# Patient Record
(demographics unavailable — no encounter records)

---

## 2024-11-12 NOTE — HISTORY OF PRESENT ILLNESS
[FreeTextEntry1] : 55F with a surgical hx of a laparoscopic sleeve gastrectomy with hiatal hernia repair 1/2023 in [place] with complications leading to the need for a conversion to a RYGB subsequently complicated with dysphagia and po intolerance PICC and TPN. She was hospitalized most recently for a bowel obstruction at [place] that was treated nonoperatively however she was told that she had a large 8cm fecaloma. Over the past 2 years she has had considerable weight loss and is now malnourished with a BMI of 16.5.  She is currently not using laxatives, suppositories or enemas and is not having bowel movements regularly. Prior to her index gastric procedure she use to have a cathartic BM every other day without the use of laxatives or stimulants. Currently BM would range between diarrhea and constipation. Had three hard BM yesterday. Prior to yesterday's BM, had not moved her bowels in three weeks.  Has an UGI follow through scheduled for this Friday scheduled by Dr. Martinez for preoperative assessment.  PMH: Anemia, thrombophilia, asthma, COPD, CKD, Lupus, Fibromyalgia   Meds: lovenox, protonix , nurtec, zofran, reglan, benadryl, toradol, diflucan  All: ciprofloxacin, codeine, Demerol, fentanyl, flagyl, morphine, penicillin, percocet, tramadol, valium, blueberries, mussels, clams.   PSH: LSG, RYGB, HHR, Cholecystectomy, Hysterectomy, sinus surgery  FH: Uncle (84) recently diagnosed with metastatic rectal cancer.  Cscope: 6/8/21 Reports last done by Dr. Augustine Reese

## 2024-11-12 NOTE — PLAN
[TextEntry] : - Recommend Fleet enema every other day to facilitate emptying 3-4x/weekly. Considering her extended bowel rest, expected limited stool volume. - RTC in 2-3 weeks for flexible sigmoidoscopy to evaluate for persistent sigmoidal impaction - Limited therapeutic options considering inability to tolerate oral laxatives. - If more proximal impaction on repeat endoscopy will consider CT

## 2024-11-12 NOTE — PHYSICAL EXAM
[JVD] : no jugular venous distention  [Normal Breath Sounds] : Normal breath sounds [Alert] : alert [Calm] : calm [de-identified] : Soft, nontender, nondistended. Well healed laparoscopic scars. Left lower quadrant fullness without tenderness.  [de-identified] : Thin appearing female in NAD [de-identified] : MMM [de-identified] : ROM WNL, RUE PICC in place without signs of infection [FreeTextEntry1] : The pt was examined in the left lateral decubitus position with a medical assistant present for the entirety of the examination. Visual examination of the anal verge with effacement of the buttocks revealed no masses, ulcerations, fissures or skin rashes. Digital rectal exam revealed no palpable mass or blood with sphincter tone within normal limits. An empty rectal vault with limited soft formed stool evacuated. A lubricated anoscope was then inserted revealing healthy appearing distal rectal mucosa and anoderm with three appropriately sized, noninflamed internal hemorrhoids.  A lubricated/lighted rigid proctoscope was then inserted to 15cm from the AV revealing normal appearing, pink and healthy rectal mucosa with formed, soft, not impacted stool at roughly 15cm from the AV that was partially removed with a long biopys forcep. The patient tolerated the examination well.

## 2024-11-12 NOTE — ASSESSMENT
[FreeTextEntry1] : 55F with considerable dysphagia due to her previous bariatric surgery now with the inability to tolerate po and living with TPN, has developed a degree of dysfunctional colon with recent fecaloma.

## 2024-11-24 NOTE — PLAN
[FreeTextEntry1] : -Plan for small bowel resection, possible revision of gastrojejunostomy and distal gastrectomy -Continue TPN to optimize nutritional parameters -Work with our dietitian to advance diet over the next 4-6 weeks to help regain weight -Follow up with Dr. Ayon on 12/11 for management if fecaloma and plan for surgery the week after -Complete full vitamins lab panel -Continue PPI therapy -Will require medical clearance and hematology clearance (hx of four blood clotting disorders, on Lovenox) -Will plan for half dose of Lovenox the night before surgery and half dose the morning of surgery but will refer to hematology recommendations.

## 2024-11-24 NOTE — ASSESSMENT
[FreeTextEntry1] : Ms. Johnston presents today with hx of conversion of sleeve gastrectomy to RYGB with PO intolerance and dysphagia, currently on TPN. She also has confirmed ulcerations on her stomach from endoscopy. Barium swallow esophagram and records reviewed today. Discussed several surgical options including re-doing the GJ anastomosis, bypass the stomach and connect her small intestine directly to her esophagus, possibly resect the blind loop but given the marginal ulcer, revision of GJ may be the better option. Her BMI is 16 despite increasing calories in TPN, discussed possibility of tube feedings but patient is not amenable to this at this time. The plan is to move forward with small bowel resection, possible revision of gastrojejunostomy and distal gastrectomy. If not responding well, the next option may be to bypass the stomach and connect small intestine to esophagus.   -Continue TPN to optimize nutritional parameters -Work with our dietitian to advance diet over the next 4-6 weeks to help regain weight -Follow up with Dr. Ayon on 12/11 for management if fecaloma and plan for surgery the week after -Complete full vitamins lab panel -Continue PPI therapy -Will require medical clearance and hematology clearance (hx of four blood clotting disorders, on Lovenox) -Will plan for half dose of Lovenox the night before surgery and half dose the morning of surgery but will refer to hematology recommendations.

## 2024-11-24 NOTE — HISTORY OF PRESENT ILLNESS
[Home] : at home, [unfilled] , at the time of the visit. [Medical Office: ([facility])___] : at the medical office located in  [Verbal consent obtained from patient] : the patient, [unfilled] [de-identified] : 55 year old female with multiple prior surgeries, most recently had her sleeve converted to a RYGB in May 2024 with Dr. Park at [facility]. Since her initial sleeve surgery in January 2023, she has been on TPN for maintenance of hejr nutrition due to PO intolerance and dysphagia. Dr. Park placed a stent, which was needed due to the angle of her anastomosis. Unfortunately she was not able to tolerate the stent and it was removed before being able to improve the problem. She was evaluated by Dr. Ayon for fecaloma who recommended fleet enema every other day to facilitate gastric emptying 3-4x/weekly and return to office for flexible sigmoidoscopy to evaluate for persistent sigmoidal impaction.    Xray Esophagram 11/15/24- Unremarkable esophagram.  Xray Cinesophagram 11/15/24- Swallowing and passage through the esophagus demonstrates normal motility with barium soaked bread. Speech pathology impression: moderate oral stage dysphagia characterized by prolonged bolus preparation, rumination and piecemeal deglutition with impact to swallow efficiency. Pharyngeal swallow is functional with no penetration, aspiration or pharyngeal residue after the swallow. [id number] patient resume a modified texture oral diet slowly with soft/moist solids and thin loquids with approval from surgeon and GI. Endoflip- normal findings

## 2024-11-24 NOTE — END OF VISIT
[FreeTextEntry3] : Alessandra Horvath NP scribed for me Dr. Artis Martinez, in my presence and the the presence of KANDICE MEADOWS either physically or via video.  The scribe documented the following sections when they are included: HISTORY OF PRESENT ILLNESS, PAST MEDICAL/SURGICAL/FAMILY/SOCIAL HISTORY; REVIEW OF SYSTEMS; VITAL SIGNS; PHYSICAL EXAM; DISPOSITION as well as ASSESSMENT and PLAN.   I confirm that the documentation above accurately reflects my interaction with the patient KANDICE MEADOWS.  I spent 45 minutes in the care of this patient including preparation, chart review, face to face time, imaging and or test results review and documentation.

## 2024-11-24 NOTE — HISTORY OF PRESENT ILLNESS
[Home] : at home, [unfilled] , at the time of the visit. [Medical Office: ([facility])___] : at the medical office located in  [Verbal consent obtained from patient] : the patient, [unfilled] [de-identified] : 55 year old female with multiple prior surgeries, most recently had her sleeve converted to a RYGB in May 2024 with Dr. Park at [facility]. Since her initial sleeve surgery in January 2023, she has been on TPN for maintenance of hejr nutrition due to PO intolerance and dysphagia. Dr. Park placed a stent, which was needed due to the angle of her anastomosis. Unfortunately she was not able to tolerate the stent and it was removed before being able to improve the problem. She was evaluated by Dr. Ayon for fecaloma who recommended fleet enema every other day to facilitate gastric emptying 3-4x/weekly and return to office for flexible sigmoidoscopy to evaluate for persistent sigmoidal impaction.    Xray Esophagram 11/15/24- Unremarkable esophagram.  Xray Cinesophagram 11/15/24- Swallowing and passage through the esophagus demonstrates normal motility with barium soaked bread. Speech pathology impression: moderate oral stage dysphagia characterized by prolonged bolus preparation, rumination and piecemeal deglutition with impact to swallow efficiency. Pharyngeal swallow is functional with no penetration, aspiration or pharyngeal residue after the swallow. [id number] patient resume a modified texture oral diet slowly with soft/moist solids and thin loquids with approval from surgeon and GI. Endoflip- normal findings

## 2024-12-11 NOTE — HISTORY OF PRESENT ILLNESS
[FreeTextEntry1] : 55F with a surgical hx of a laparoscopic sleeve gastrectomy with hiatal hernia repair 1/2023 in [place] with complications leading to the need for a conversion to a RYGB subsequently complicated with dysphagia and po intolerance PICC and TPN. She was hospitalized most recently for a bowel obstruction at [place] that was treated nonoperatively however she was told that she had a large 8cm fecaloma. Over the past 2 years she has had considerable weight loss and is now malnourished with a BMI of 16.5. She is currently not using laxatives, suppositories or enemas and is not having bowel movements regularly. Prior to her index gastric procedure she used to have a cathartic BM every other day without the use of laxatives or stimulants. On her initial visit last month, soft nonimpacted stool was visualized 15cm from anal verge and was partially removed with long biopsy forceps. Patient here today for flexible sigmoidoscopy to evaluate for sigmoid fecal impaction. Since last visit she had diarrhea for a week and started using the enema every other day, her last bowel movement was Sunday and her last enema was Monday.  Of note, she is currently on Cefazolin 500mg TID for a PICC line infection and has a RUE DVT which she is continuing her lovenox to treat.  She is scheduled for small bowel resection on 12/16 with Dr. Monaco.  PMH: Anemia, thrombophilia, asthma, COPD, CKD, Lupus, Fibromyalgia Meds: lovenox, protonix , nurtec, zofran, reglan, benadryl, toradol, diflucan All: ciprofloxacin, codeine, Demerol, fentanyl, flagyl, morphine, penicillin, percocet, tramadol, valium, blueberries, mussels, clams. PSH: LSG, RYGB, HHR, Cholecystectomy, Hysterectomy, sinus surgery FH: Uncle (84) recently diagnosed with metastatic rectal cancer. Cscope: 6/8/21 Reports last done by Dr. Augustine Reese

## 2024-12-11 NOTE — PHYSICAL EXAM
[JVD] : no jugular venous distention  [Normal Breath Sounds] : Normal breath sounds [Alert] : alert [Calm] : calm [de-identified] : Soft, nontender, nondistended. Well healed laparoscopic scars. Left lower quadrant fullness without tenderness.  [de-identified] : Thin appearing female in NAD [de-identified] : MMM [de-identified] : ROM WNL, RUE PICC in place without signs of infection [FreeTextEntry1] : The pt was examined in the left lateral decubitus position with a medical assistant present for the entirety of the examination. Visual examination of the anal verge with effacement of the buttocks revealed no masses, ulcerations, fissures or skin rashes. Digital rectal exam revealed no palpable mass or blood with sphincter tone within normal limits. A lubricated flexible sigmoidoscope was inserted to 60cm revealing healthy left and sigmoid colon with tortuous redundant sigmoid colon. Formed stool encountered likely at the splenic flexure that was partially traversable, did not appear to be impacted and there was no reactive colitis associated with the area. Otherwise upon withdrawal there was normal appearing mucosa without ulceration, mass or inflammation.  The patient tolerated the exam well.

## 2025-01-22 NOTE — HISTORY OF PRESENT ILLNESS
[de-identified] : This is a 54 yo female with multiple prior surgeries, most recently had her sleeve converted to a RYGB in May 2024 with Dr. Park at [facility] presents today for follow up s/p uncomplicated small bowel resections revision of gastrojejunostomy distal gastectomy on12/16/2024.  Post-operatively patient is doing well, report having better food tolerance with some residual difficulty. Pain is well controlled and has been improving. Patient is tolerating puree diet with mashed potatoes and grounded chicken, still on TPN with frequent nausea or vomiting(foamy) with 2 lb weight increase from 84lb to 86lbs. Pt reports food stucking with swallowing. Denies any difficulty with bowel movements or urinating. Denies constipation. Offers no other acute complaints at this time. Denies chest pain, SOB, CHRISTOPHER, calf pain, HA, dizziness, fever or chills since surgery.     Imaging: Labs 11/2024- elevated BUN/Creat ratio(31), low CO2(15) Xray ribs right with PA chest 11/25/24- negative exam. normal heart size and pulmonary vascularity. No fracture. EKG 11/26/24- NSR Xray Esophagram 11/15/24- Unremarkable esophagram. Xray Cinesophagram 11/15/24- Swallowing and passage through the esophagus demonstrates normal motility with barium soaked bread. Speech pathology impression: moderate oral stage dysphagia characterized by prolonged bolus preparation, rumination and piecemeal deglutition with impact to swallow efficiency. Pharyngeal swallow is functional with no penetration, aspiration or pharyngeal residue after the swallow. [id number] patient resume a modified texture oral diet slowly with soft/moist solids and thin liquids with approval from surgeon and GI. Endoflip- normal findings.

## 2025-01-22 NOTE — PHYSICAL EXAM
[Thin] : thin [Normal] : affect appropriate [de-identified] : Underweight  [de-identified] : Incision - healing well, clean dry intact, no evidence of drainage, purulence, surrounding erythema/ seroma/ hematoma/ bleeding. No recurrent of hernia with valsalva changes. [FreeTextEntry1] : deferred

## 2025-01-22 NOTE — PHYSICAL EXAM
[Thin] : thin [Normal] : affect appropriate [de-identified] : Underweight  [de-identified] : Incision - healing well, clean dry intact, no evidence of drainage, purulence, surrounding erythema/ seroma/ hematoma/ bleeding. No recurrent of hernia with valsalva changes. [FreeTextEntry1] : deferred

## 2025-01-22 NOTE — REASON FOR VISIT
[Post Operative Visit] : a post operative visit for [FreeTextEntry2] : 5 weeks f/u s/p small bowel resections revision of gastrojejunostomy distal gastectomy 12/16/2024

## 2025-01-22 NOTE — ADDENDUM
[FreeTextEntry1] :  I, JASMIN DEGROOT, am scribing for and in the presence of Dr. Martinez for the following sections: HISTORY OF PRESENT ILLNESS, PAST MEDICAL HISTORY, PAST SURGICAL HISTORY, FAMILY/SOCIAL HISTORY, REVIEW OF SYSTEMS, VITAL SIGNS, PHYSICAL EXAM, DISPOSITION. Reviewed current treatment plan, including medications / surgical intervention / lifestyle modifications where indicated. Reviewed imaging, laboratory results, or other diagnostics as applicable. Addressed patient concerns, including potential complications, risk factors, or alternative treatment options as applicable. Provided detailed education on pre- or post-operative instructions,and expected outcome as applicable. Instructed pt to call office for questions or concerns. Instructed patient to schedule follow-up appointment as recommended. Referrals and testing ordered where indicated. Plan of care reviewed with patient, and questions answered. The total time spent with patient included more than 50% of the time dedicated to counseling and coordination of care.

## 2025-01-22 NOTE — ASSESSMENT
[FreeTextEntry1] : This is a 56 yo female with multiple prior surgeries, most recently had her sleeve converted to a RYGB in May 2024 with Dr. Park at [facility] presents today for follow up s/p uncomplicated small bowel resections revision of gastrojejunostomy distal gastectomy on12/16/2024.  Post-operatively patient is doing well, report having better food tolerance with some residual difficulty. Pain is well controlled and has been improving. Patient is tolerating puree diet with mashed potatoes and grounded chicken, still on TPN with frequent nausea or vomiting(foamy) with 2 lb weight increase from 84lb to 86lbs. Pt reports food stucking with swallowing. Denies any difficulty with bowel movements or urinating. Denies constipation. Offers no other acute complaints at this time. Denies chest pain, SOB, CHRISTOPHER, calf pain, HA, dizziness, fever or chills since surgery.     Imaging: Labs 11/2024- elevated BUN/Creat ratio(31), low CO2(15) Xray ribs right with PA chest 11/25/24- negative exam. normal heart size and pulmonary vascularity. No fracture. EKG 11/26/24- NSR Xray Esophagram 11/15/24- Unremarkable esophagram. Xray Cinesophagram 11/15/24- Swallowing and passage through the esophagus demonstrates normal motility with barium soaked bread. Speech pathology impression: moderate oral stage dysphagia characterized by prolonged bolus preparation, rumination and piecemeal deglutition with impact to swallow efficiency. Pharyngeal swallow is functional with no penetration, aspiration or pharyngeal residue after the swallow. [id number] patient resume a modified texture oral diet slowly with soft/moist solids and thin liquids with approval from surgeon and GI. Endoflip- normal findings.  -Patient is doing well post-operatively.  -Exam: incisions are healing well.

## 2025-01-22 NOTE — PLAN
[FreeTextEntry1] : s/p small bowel resections revision of gastrojejunostomy distal gastectomy 12/16/2024:  - On post-op check patient is doing well and incisions are healing properly. No signs of post-operative complications at this time.  -Continue TPN to optimize nutritional parameters, pt can advance diet as tolerated. Can follow up with dietitians to explore more options to regain weights  - May consider conversion back to Sleeve with LINX - Obtain repeat Upper GI xray with barium soaked bread +barium tablet - Trial Mariol for nausea, vomiting, and malnutrition - Patient can continue to follow up outpatient should she have any worsening of symptoms, pain, or complications.

## 2025-01-22 NOTE — END OF VISIT
[FreeTextEntry3] : Karon Back, NP, scribed for me, Dr. Artis Martinez, in my presence and the the presence of KANDICE MEADOWS either physically or via video.  The scribe documented the following sections when they are included: HISTORY OF PRESENT ILLNESS, PAST MEDICAL/SURGICAL/FAMILY/SOCIAL HISTORY; REVIEW OF SYSTEMS; VITAL SIGNS; PHYSICAL EXAM; DISPOSITION as well as ASSESSMENT and PLAN.   I confirm that the documentation above accurately reflects my interaction with the patient KANDICE MEADOWS.   I spent __30__ minutes in the care of this patient including preparation, chart review, face to face time, imaging and or test results review and documentation.

## 2025-01-22 NOTE — ASSESSMENT
[FreeTextEntry1] : This is a 54 yo female with multiple prior surgeries, most recently had her sleeve converted to a RYGB in May 2024 with Dr. Park at [facility] presents today for follow up s/p uncomplicated small bowel resections revision of gastrojejunostomy distal gastectomy on12/16/2024.  Post-operatively patient is doing well, report having better food tolerance with some residual difficulty. Pain is well controlled and has been improving. Patient is tolerating puree diet with mashed potatoes and grounded chicken, still on TPN with frequent nausea or vomiting(foamy) with 2 lb weight increase from 84lb to 86lbs. Pt reports food stucking with swallowing. Denies any difficulty with bowel movements or urinating. Denies constipation. Offers no other acute complaints at this time. Denies chest pain, SOB, CHRISTOPHER, calf pain, HA, dizziness, fever or chills since surgery.     Imaging: Labs 11/2024- elevated BUN/Creat ratio(31), low CO2(15) Xray ribs right with PA chest 11/25/24- negative exam. normal heart size and pulmonary vascularity. No fracture. EKG 11/26/24- NSR Xray Esophagram 11/15/24- Unremarkable esophagram. Xray Cinesophagram 11/15/24- Swallowing and passage through the esophagus demonstrates normal motility with barium soaked bread. Speech pathology impression: moderate oral stage dysphagia characterized by prolonged bolus preparation, rumination and piecemeal deglutition with impact to swallow efficiency. Pharyngeal swallow is functional with no penetration, aspiration or pharyngeal residue after the swallow. [id number] patient resume a modified texture oral diet slowly with soft/moist solids and thin liquids with approval from surgeon and GI. Endoflip- normal findings.  -Patient is doing well post-operatively.  -Exam: incisions are healing well.

## 2025-01-22 NOTE — HISTORY OF PRESENT ILLNESS
[de-identified] : This is a 56 yo female with multiple prior surgeries, most recently had her sleeve converted to a RYGB in May 2024 with Dr. Park at [facility] presents today for follow up s/p uncomplicated small bowel resections revision of gastrojejunostomy distal gastectomy on12/16/2024.  Post-operatively patient is doing well, report having better food tolerance with some residual difficulty. Pain is well controlled and has been improving. Patient is tolerating puree diet with mashed potatoes and grounded chicken, still on TPN with frequent nausea or vomiting(foamy) with 2 lb weight increase from 84lb to 86lbs. Pt reports food stucking with swallowing. Denies any difficulty with bowel movements or urinating. Denies constipation. Offers no other acute complaints at this time. Denies chest pain, SOB, CHRISTOPHER, calf pain, HA, dizziness, fever or chills since surgery.     Imaging: Labs 11/2024- elevated BUN/Creat ratio(31), low CO2(15) Xray ribs right with PA chest 11/25/24- negative exam. normal heart size and pulmonary vascularity. No fracture. EKG 11/26/24- NSR Xray Esophagram 11/15/24- Unremarkable esophagram. Xray Cinesophagram 11/15/24- Swallowing and passage through the esophagus demonstrates normal motility with barium soaked bread. Speech pathology impression: moderate oral stage dysphagia characterized by prolonged bolus preparation, rumination and piecemeal deglutition with impact to swallow efficiency. Pharyngeal swallow is functional with no penetration, aspiration or pharyngeal residue after the swallow. [id number] patient resume a modified texture oral diet slowly with soft/moist solids and thin liquids with approval from surgeon and GI. Endoflip- normal findings.

## 2025-01-22 NOTE — ASSESSMENT
[FreeTextEntry1] : This is a 54 yo female with multiple prior surgeries, most recently had her sleeve converted to a RYGB in May 2024 with Dr. Park at [facility] presents today for follow up s/p uncomplicated small bowel resections revision of gastrojejunostomy distal gastectomy on12/16/2024.  Post-operatively patient is doing well, report having better food tolerance with some residual difficulty. Pain is well controlled and has been improving. Patient is tolerating puree diet with mashed potatoes and grounded chicken, still on TPN with frequent nausea or vomiting(foamy) with 2 lb weight increase from 84lb to 86lbs. Pt reports food stucking with swallowing. Denies any difficulty with bowel movements or urinating. Denies constipation. Offers no other acute complaints at this time. Denies chest pain, SOB, CHRISTOHPER, calf pain, HA, dizziness, fever or chills since surgery.     Imaging: Labs 11/2024- elevated BUN/Creat ratio(31), low CO2(15) Xray ribs right with PA chest 11/25/24- negative exam. normal heart size and pulmonary vascularity. No fracture. EKG 11/26/24- NSR Xray Esophagram 11/15/24- Unremarkable esophagram. Xray Cinesophagram 11/15/24- Swallowing and passage through the esophagus demonstrates normal motility with barium soaked bread. Speech pathology impression: moderate oral stage dysphagia characterized by prolonged bolus preparation, rumination and piecemeal deglutition with impact to swallow efficiency. Pharyngeal swallow is functional with no penetration, aspiration or pharyngeal residue after the swallow. [id number] patient resume a modified texture oral diet slowly with soft/moist solids and thin liquids with approval from surgeon and GI. Endoflip- normal findings.  -Patient is doing well post-operatively.  -Exam: incisions are healing well.

## 2025-01-22 NOTE — HISTORY OF PRESENT ILLNESS
[de-identified] : This is a 54 yo female with multiple prior surgeries, most recently had her sleeve converted to a RYGB in May 2024 with Dr. Park at [facility] presents today for follow up s/p uncomplicated small bowel resections revision of gastrojejunostomy distal gastectomy on12/16/2024.  Post-operatively patient is doing well, report having better food tolerance with some residual difficulty. Pain is well controlled and has been improving. Patient is tolerating puree diet with mashed potatoes and grounded chicken, still on TPN with frequent nausea or vomiting(foamy) with 2 lb weight increase from 84lb to 86lbs. Pt reports food stucking with swallowing. Denies any difficulty with bowel movements or urinating. Denies constipation. Offers no other acute complaints at this time. Denies chest pain, SOB, CHRISTOPHER, calf pain, HA, dizziness, fever or chills since surgery.     Imaging: Labs 11/2024- elevated BUN/Creat ratio(31), low CO2(15) Xray ribs right with PA chest 11/25/24- negative exam. normal heart size and pulmonary vascularity. No fracture. EKG 11/26/24- NSR Xray Esophagram 11/15/24- Unremarkable esophagram. Xray Cinesophagram 11/15/24- Swallowing and passage through the esophagus demonstrates normal motility with barium soaked bread. Speech pathology impression: moderate oral stage dysphagia characterized by prolonged bolus preparation, rumination and piecemeal deglutition with impact to swallow efficiency. Pharyngeal swallow is functional with no penetration, aspiration or pharyngeal residue after the swallow. [id number] patient resume a modified texture oral diet slowly with soft/moist solids and thin liquids with approval from surgeon and GI. Endoflip- normal findings.

## 2025-06-06 NOTE — REVIEW OF SYSTEMS
[Negative] : Allergic/Immunologic [Abdominal Pain] : abdominal pain [Vomiting] : vomiting [Reflux/Heartburn] : reflux/heartburn

## 2025-06-06 NOTE — REASON FOR VISIT
[Home] : at home, [unfilled] , at the time of the visit. [Medical Office: ([facility])___] : at the medical office located in  [Telehealth (audio & video)] : This visit was provided via telehealth using real-time 2-way audio visual technology. [Verbal consent obtained from patient] : the patient, [unfilled] [Follow-Up Visit] : a follow-up visit for [FreeTextEntry2] : s/p uncomplicated small bowel resection, revision of gastrojejunostomy, and distal gastrectomy on 12/16/24

## 2025-06-06 NOTE — ASSESSMENT
[FreeTextEntry1] : This is a 55 yo female with multiple prior surgeries, most recently had her sleeve converted to a RYGB in May 2024 with Dr. Park at [facility] presents today for follow up s/p uncomplicated small bowel resections revision of gastrojejunostomy distal gastrectomy on 12/16/2024.  Post-operatively patient reports experiencing ongoing issues with food tolerance and vomitting following conversation of sleeve gastrectomy to gastric bypass. 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 Patient is still on TPN with frequent nausea and vomiting(foamy). Pt reports food stucking with swallowing. Denies any difficulty with bowel movements or urinating. Denies constipation. Offers no other acute complaints at this time. Denies chest pain, SOB, CHRISTOPHER, calf pain, HA, dizziness, fever or chills since surgery.  Imaging: UGI: unable to consume contrast per radiologist, test undiagnostic.  Xray ribs right with PA chest 11/25/24- negative exam. normal heart size and pulmonary vascularity. No fracture. EKG 11/26/24- NSR Xray Esophagram 11/15/24- Unremarkable esophagram. Xray Cinesophagram 11/15/24- Swallowing and passage through the esophagus demonstrates normal motility with barium soaked bread. Speech pathology impression: moderate oral stage dysphagia characterized by prolonged bolus preparation, rumination and piecemeal deglutition with impact to swallow efficiency. Pharyngeal swallow is functional with no penetration, aspiration or pharyngeal residue after the swallow. Recommend patient resume a modified texture oral diet slowly with soft/moist solids and thin liquids with approval from surgeon and GI. Endoflip- normal findings.  *Discussed converting TPN to G-tube feeding for one week with the patient. She declined at this time. An EGD, UGI, and modified esophagram with barium swallow are needed. Refer to Dr. Ford for esophageal manometry. Discuss the options of attaching the esophagus directly to the intestine versus using an intestinal graft. Discussed obstruction is the likely diagnosis due to her foamy vomiting. Further workup is needed to rule out other possibilities.

## 2025-06-06 NOTE — HISTORY OF PRESENT ILLNESS
[de-identified] : This is a 57 yo female with multiple prior surgeries, most recently had her sleeve converted to a RYGB in May 2024 with Dr. Park at [facility] presents today for follow up s/p uncomplicated small bowel resections revision of gastrojejunostomy distal gastrectomy on 12/16/2024.  Post-operatively patient reports experiencing ongoing issues with food tolerance and vomitting following conversation of sleeve gastrectomy to gastric bypass. 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 Patient is still on TPN with frequent nausea and vomiting(foamy). Pt reports food stucking with swallowing. Denies any difficulty with bowel movements or urinating. Denies constipation. Offers no other acute complaints at this time. Denies chest pain, SOB, CHRISTOPHER, calf pain, HA, dizziness, fever or chills since surgery.  Imaging: UGI: unable to consume contrast per radiologist, test undiagnostic.  Xray ribs right with PA chest 11/25/24- negative exam. normal heart size and pulmonary vascularity. No fracture. EKG 11/26/24- NSR Xray Esophagram 11/15/24- Unremarkable esophagram. Xray Cinesophagram 11/15/24- Swallowing and passage through the esophagus demonstrates normal motility with barium soaked bread. Speech pathology impression: moderate oral stage dysphagia characterized by prolonged bolus preparation, rumination and piecemeal deglutition with impact to swallow efficiency. Pharyngeal swallow is functional with no penetration, aspiration or pharyngeal residue after the swallow. Recommend patient resume a modified texture oral diet slowly with soft/moist solids and thin liquids with approval from surgeon and GI. Endoflip- normal findings.

## 2025-06-06 NOTE — HISTORY OF PRESENT ILLNESS
[de-identified] : This is a 57 yo female with multiple prior surgeries, most recently had her sleeve converted to a RYGB in May 2024 with Dr. Park at [facility] presents today for follow up s/p uncomplicated small bowel resections revision of gastrojejunostomy distal gastrectomy on 12/16/2024.  Post-operatively patient reports experiencing ongoing issues with food tolerance and vomitting following conversation of sleeve gastrectomy to gastric bypass. 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 Patient is still on TPN with frequent nausea and vomiting(foamy). Pt reports food stucking with swallowing. Denies any difficulty with bowel movements or urinating. Denies constipation. Offers no other acute complaints at this time. Denies chest pain, SOB, CHRISTOPHER, calf pain, HA, dizziness, fever or chills since surgery.  Imaging: UGI: unable to consume contrast per radiologist, test undiagnostic.  Xray ribs right with PA chest 11/25/24- negative exam. normal heart size and pulmonary vascularity. No fracture. EKG 11/26/24- NSR Xray Esophagram 11/15/24- Unremarkable esophagram. Xray Cinesophagram 11/15/24- Swallowing and passage through the esophagus demonstrates normal motility with barium soaked bread. Speech pathology impression: moderate oral stage dysphagia characterized by prolonged bolus preparation, rumination and piecemeal deglutition with impact to swallow efficiency. Pharyngeal swallow is functional with no penetration, aspiration or pharyngeal residue after the swallow. Recommend patient resume a modified texture oral diet slowly with soft/moist solids and thin liquids with approval from surgeon and GI. Endoflip- normal findings.

## 2025-06-06 NOTE — ASSESSMENT
[FreeTextEntry1] : This is a 55 yo female with multiple prior surgeries, most recently had her sleeve converted to a RYGB in May 2024 with Dr. Park at [facility] presents today for follow up s/p uncomplicated small bowel resections revision of gastrojejunostomy distal gastrectomy on 12/16/2024.  Post-operatively patient reports experiencing ongoing issues with food tolerance and vomitting following conversation of sleeve gastrectomy to gastric bypass. 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 Patient is still on TPN with frequent nausea and vomiting(foamy). Pt reports food stucking with swallowing. Denies any difficulty with bowel movements or urinating. Denies constipation. Offers no other acute complaints at this time. Denies chest pain, SOB, CHRISTOPHER, calf pain, HA, dizziness, fever or chills since surgery.  Imaging: UGI: unable to consume contrast per radiologist, test undiagnostic.  Xray ribs right with PA chest 11/25/24- negative exam. normal heart size and pulmonary vascularity. No fracture. EKG 11/26/24- NSR Xray Esophagram 11/15/24- Unremarkable esophagram. Xray Cinesophagram 11/15/24- Swallowing and passage through the esophagus demonstrates normal motility with barium soaked bread. Speech pathology impression: moderate oral stage dysphagia characterized by prolonged bolus preparation, rumination and piecemeal deglutition with impact to swallow efficiency. Pharyngeal swallow is functional with no penetration, aspiration or pharyngeal residue after the swallow. Recommend patient resume a modified texture oral diet slowly with soft/moist solids and thin liquids with approval from surgeon and GI. Endoflip- normal findings.  *Discussed converting TPN to G-tube feeding for one week with the patient. She declined at this time. An EGD, UGI, and modified esophagram with barium swallow are needed. Refer to Dr. Ford for esophageal manometry. Discuss the options of attaching the esophagus directly to the intestine versus using an intestinal graft. Discussed obstruction is the likely diagnosis due to her foamy vomiting. Further workup is needed to rule out other possibilities.

## 2025-06-06 NOTE — PLAN
[FreeTextEntry1] : s/p small bowel resections revision of gastrojejunostomy distal gastectomy 12/16/2024, 6 Months: - On post-op check patient is doing well and incisions are healing properly. No signs of post-operative complications at this time. -Continue TPN to optimize nutritional parameters, pt can advance diet as tolerated. Can follow up with dietitians to explore more options to regain weights - Obtain modified barium swallow with barium-soaked bread together with upper GI Xray.  - Obtain esophageal manometry with Dr. Drake Ford, referral sent. - Schedule EGD with Dr. Martinez - Start Levsin 0.125mg sublingual as needed.  - Start Syndros 2.5mg three times a day as need for nausea.  - Will start on famotidine 20mg with [facility].  - F/U in office or via telehealth once all testings resulted to discuss planning.  - Patient can continue to follow up outpatient should she have any worsening of symptoms, pain, or complications.

## 2025-06-06 NOTE — REASON FOR VISIT
[Home] : at home, [unfilled] , at the time of the visit. [Medical Office: ([facility])___] : at the medical office located in  [Telehealth (audio & video)] : This visit was provided via telehealth using real-time 2-way audio visual technology. [Verbal consent obtained from patient] : the patient, [unfilled] [Follow-Up Visit] : a follow-up visit for [FreeTextEntry2] : s/p uncomplicated small bowel resection, revision of gastrojejunostomy, and distal gastrectomy on 12/16/24

## 2025-06-06 NOTE — HISTORY OF PRESENT ILLNESS
[de-identified] : This is a 55 yo female with multiple prior surgeries, most recently had her sleeve converted to a RYGB in May 2024 with Dr. Park at [facility] presents today for follow up s/p uncomplicated small bowel resections revision of gastrojejunostomy distal gastrectomy on 12/16/2024.  Post-operatively patient reports experiencing ongoing issues with food tolerance and vomitting following conversation of sleeve gastrectomy to gastric bypass. 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 Patient is still on TPN with frequent nausea and vomiting(foamy). Pt reports food stucking with swallowing. Denies any difficulty with bowel movements or urinating. Denies constipation. Offers no other acute complaints at this time. Denies chest pain, SOB, CHRISTOPHER, calf pain, HA, dizziness, fever or chills since surgery.  Imaging: UGI: unable to consume contrast per radiologist, test undiagnostic.  Xray ribs right with PA chest 11/25/24- negative exam. normal heart size and pulmonary vascularity. No fracture. EKG 11/26/24- NSR Xray Esophagram 11/15/24- Unremarkable esophagram. Xray Cinesophagram 11/15/24- Swallowing and passage through the esophagus demonstrates normal motility with barium soaked bread. Speech pathology impression: moderate oral stage dysphagia characterized by prolonged bolus preparation, rumination and piecemeal deglutition with impact to swallow efficiency. Pharyngeal swallow is functional with no penetration, aspiration or pharyngeal residue after the swallow. Recommend patient resume a modified texture oral diet slowly with soft/moist solids and thin liquids with approval from surgeon and GI. Endoflip- normal findings.

## 2025-06-06 NOTE — REASON FOR VISIT
[Home] : at home, [unfilled] , at the time of the visit. [Medical Office: ([facility])___] : at the medical office located in  [Telehealth (audio & video)] : This visit was provided via telehealth using real-time 2-way audio visual technology. [Verbal consent obtained from patient] : the patient, [unfilled] [Follow-Up Visit] : a follow-up visit for [FreeTextEntry2] : s/p uncomplicated small bowel resection, revision of gastrojejunostomy, and distal gastrectomy on 12/16/24

## 2025-06-06 NOTE — ASSESSMENT
[FreeTextEntry1] : This is a 55 yo female with multiple prior surgeries, most recently had her sleeve converted to a RYGB in May 2024 with Dr. Park at [facility] presents today for follow up s/p uncomplicated small bowel resections revision of gastrojejunostomy distal gastrectomy on 12/16/2024.  Post-operatively patient reports experiencing ongoing issues with food tolerance and vomitting following conversation of sleeve gastrectomy to gastric bypass. 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 Patient is still on TPN with frequent nausea and vomiting(foamy). Pt reports food stucking with swallowing. Denies any difficulty with bowel movements or urinating. Denies constipation. Offers no other acute complaints at this time. Denies chest pain, SOB, CHRISTOPHER, calf pain, HA, dizziness, fever or chills since surgery.  Imaging: UGI: unable to consume contrast per radiologist, test undiagnostic.  Xray ribs right with PA chest 11/25/24- negative exam. normal heart size and pulmonary vascularity. No fracture. EKG 11/26/24- NSR Xray Esophagram 11/15/24- Unremarkable esophagram. Xray Cinesophagram 11/15/24- Swallowing and passage through the esophagus demonstrates normal motility with barium soaked bread. Speech pathology impression: moderate oral stage dysphagia characterized by prolonged bolus preparation, rumination and piecemeal deglutition with impact to swallow efficiency. Pharyngeal swallow is functional with no penetration, aspiration or pharyngeal residue after the swallow. Recommend patient resume a modified texture oral diet slowly with soft/moist solids and thin liquids with approval from surgeon and GI. Endoflip- normal findings.  *Discussed converting TPN to G-tube feeding for one week with the patient. She declined at this time. An EGD, UGI, and modified esophagram with barium swallow are needed. Refer to Dr. Ford for esophageal manometry. Discuss the options of attaching the esophagus directly to the intestine versus using an intestinal graft. Discussed obstruction is the likely diagnosis due to her foamy vomiting. Further workup is needed to rule out other possibilities.